# Patient Record
Sex: MALE | Race: OTHER | HISPANIC OR LATINO
[De-identification: names, ages, dates, MRNs, and addresses within clinical notes are randomized per-mention and may not be internally consistent; named-entity substitution may affect disease eponyms.]

---

## 2023-08-24 ENCOUNTER — APPOINTMENT (OUTPATIENT)
Dept: ORTHOPEDIC SURGERY | Facility: CLINIC | Age: 38
End: 2023-08-24
Payer: MEDICAID

## 2023-08-24 VITALS — WEIGHT: 155 LBS | HEIGHT: 66 IN | BODY MASS INDEX: 24.91 KG/M2

## 2023-08-24 DIAGNOSIS — Z78.9 OTHER SPECIFIED HEALTH STATUS: ICD-10-CM

## 2023-08-24 PROBLEM — Z00.00 ENCOUNTER FOR PREVENTIVE HEALTH EXAMINATION: Status: ACTIVE | Noted: 2023-08-24

## 2023-08-24 PROCEDURE — 73130 X-RAY EXAM OF HAND: CPT | Mod: LT

## 2023-08-24 PROCEDURE — 99204 OFFICE O/P NEW MOD 45 MIN: CPT | Mod: 57

## 2023-08-24 PROCEDURE — 26600 TREAT METACARPAL FRACTURE: CPT

## 2023-08-24 NOTE — ASSESSMENT
[FreeTextEntry1] : The condition was explained to the patient. Fracture alignment within acceptable limits. Plan to proceed with non-operative treatment.  We discussed that although there may be some imperfect alignment on X-ray, the patient would likely do best with early motion exercises to minimize stiffness. We discussed that flexion at the fracture site was well tolerated. We discussed that there would be a permanent deformity at the fracture site and possible extensor lag. We discussed the importance of good function over good X-rays and that performing surgery may lead to unnecessary scar tissue formation. We discussed the benefit of abiel strapping and early range of motion. The patient may benefit from therapy. We did discuss the goals of surgery when indicated for malrotation or extreme flexion and what to expect. Risks of treatment include, but are not limited to persistent pain, stiffness, fracture displacement, need for future surgery, mal-union, non-union. All patient questions were answered. Patient expressed understanding and would like to proceed with the non-operative treatment plan.  Risks include, but are not limited to persistent pain, stiffness, post-traumatic arthritis, fracture displacement, need for future surgery, mal-union, non-union. Patient expressed understanding. - applied abiel loop to SF/RF and ACE wrap to hand/wrist, full time except hygiene x 3-4wks. - encouraged HEP for digital ROM to reduce stiffness. - elevate hand above level of heart as much as possible to reduce swelling. - NWB to L hand. no gym/sports. - Work: recommend OOW.  F/u 2wk. X-rays L hand.

## 2023-08-24 NOTE — HISTORY OF PRESENT ILLNESS
[de-identified] : 8/24/23: 39yo RHD male ([employer]) presents for LEFT hand pain after a fall while moving furniture on 8/20/23. Reports that he went to [facility] ER => XR showed fx, which was splinted.  Hx: none. [FreeTextEntry1] : LT Hand  [FreeTextEntry5] : Gumaro is a 38 Y m, pt was moving 08/20 furniture and pt fell and landed on the LT hand. Went to the ER 08/22 did X Rays and stated the hand was fractured.

## 2023-08-24 NOTE — IMAGING
[de-identified] : LEFT HAND skin intact. mild to moderate diffuse swelling of hand. TTP to 4th metacarpal. non-tender to snuffbox, elbow. good EPL, FPL. good finger extension, flex to mid-palm. no scissoring. good finger abduction and adduction.  SILT to median, ulnar, radial distribution.  palpable radial pulse, brisk cap refill all digits. no triggering.  XRAYS OF LEFT HAND: in splint. minimally displaced 4th metacarpal shaft fracture.

## 2023-09-06 ENCOUNTER — APPOINTMENT (OUTPATIENT)
Dept: ORTHOPEDIC SURGERY | Facility: CLINIC | Age: 38
End: 2023-09-06

## 2023-09-07 ENCOUNTER — APPOINTMENT (OUTPATIENT)
Dept: ORTHOPEDIC SURGERY | Facility: CLINIC | Age: 38
End: 2023-09-07

## 2023-10-16 ENCOUNTER — APPOINTMENT (OUTPATIENT)
Dept: ORTHOPEDIC SURGERY | Facility: CLINIC | Age: 38
End: 2023-10-16
Payer: MEDICAID

## 2023-10-16 VITALS — WEIGHT: 155 LBS | HEIGHT: 66 IN | BODY MASS INDEX: 24.91 KG/M2

## 2023-10-16 DIAGNOSIS — M76.51 PATELLAR TENDINITIS, RIGHT KNEE: ICD-10-CM

## 2023-10-16 DIAGNOSIS — M22.2X1 PATELLOFEMORAL DISORDERS, RIGHT KNEE: ICD-10-CM

## 2023-10-16 PROCEDURE — 73562 X-RAY EXAM OF KNEE 3: CPT | Mod: RT

## 2023-10-16 PROCEDURE — 99214 OFFICE O/P EST MOD 30 MIN: CPT

## 2023-10-16 RX ORDER — NAPROXEN 500 MG/1
500 TABLET ORAL
Qty: 20 | Refills: 0 | Status: ACTIVE | COMMUNITY
Start: 2023-10-16 | End: 1900-01-01

## 2023-10-19 ENCOUNTER — APPOINTMENT (OUTPATIENT)
Dept: ORTHOPEDIC SURGERY | Facility: CLINIC | Age: 38
End: 2023-10-19
Payer: MEDICAID

## 2023-10-19 DIAGNOSIS — S62.325A DISPLACED FRACTURE OF SHAFT OF FOURTH METACARPAL BONE, LEFT HAND, INITIAL ENCOUNTER FOR CLOSED FRACTURE: ICD-10-CM

## 2023-10-19 PROCEDURE — 73130 X-RAY EXAM OF HAND: CPT | Mod: LT

## 2023-10-19 PROCEDURE — 99024 POSTOP FOLLOW-UP VISIT: CPT

## 2023-11-24 ENCOUNTER — APPOINTMENT (OUTPATIENT)
Dept: ORTHOPEDIC SURGERY | Facility: CLINIC | Age: 38
End: 2023-11-24

## 2023-12-01 ENCOUNTER — APPOINTMENT (OUTPATIENT)
Dept: ORTHOPEDIC SURGERY | Facility: CLINIC | Age: 38
End: 2023-12-01